# Patient Record
Sex: FEMALE | URBAN - METROPOLITAN AREA
[De-identification: names, ages, dates, MRNs, and addresses within clinical notes are randomized per-mention and may not be internally consistent; named-entity substitution may affect disease eponyms.]

---

## 2020-08-24 ENCOUNTER — APPOINTMENT (RX ONLY)
Dept: URBAN - METROPOLITAN AREA CLINIC 125 | Facility: CLINIC | Age: 45
Setting detail: DERMATOLOGY
End: 2020-08-24

## 2020-08-24 DIAGNOSIS — L30.0 NUMMULAR DERMATITIS: ICD-10-CM

## 2020-08-24 DIAGNOSIS — L84 CORNS AND CALLOSITIES: ICD-10-CM

## 2020-08-24 PROBLEM — L30.9 DERMATITIS, UNSPECIFIED: Status: ACTIVE | Noted: 2020-08-24

## 2020-08-24 PROCEDURE — ? TREATMENT REGIMEN

## 2020-08-24 PROCEDURE — ? COUNSELING

## 2020-08-24 PROCEDURE — 99202 OFFICE O/P NEW SF 15 MIN: CPT

## 2020-08-24 PROCEDURE — ? ADDITIONAL NOTES

## 2020-08-24 ASSESSMENT — LOCATION SIMPLE DESCRIPTION DERM
LOCATION SIMPLE: LEFT CHEEK
LOCATION SIMPLE: PLANTAR SURFACE OF LEFT 5TH TOE

## 2020-08-24 ASSESSMENT — LOCATION DETAILED DESCRIPTION DERM
LOCATION DETAILED: LEFT LATERAL PLANTAR 5TH TOE
LOCATION DETAILED: LEFT CENTRAL MALAR CHEEK

## 2020-08-24 ASSESSMENT — LOCATION ZONE DERM
LOCATION ZONE: TOE
LOCATION ZONE: FACE

## 2020-08-24 NOTE — PROCEDURE: TREATMENT REGIMEN
Plan: AAA of face bid x one week on, one week off PRN for flares
Detail Level: Zone
Initiate Treatment: Hydrocortisone 2.5% ointment (paper script faxed)

## 2020-08-24 NOTE — HPI: SKIN LESION
Is This A New Presentation, Or A Follow-Up?: Skin Lesion
How Severe Is Your Skin Lesion?: severe
Has Your Skin Lesion Been Treated?: been treated
Additional History: Patient states she had a wart treated at this site, would like it evaluated to ensure wart has completely resolved.

## 2020-08-24 NOTE — HPI: RASH
How Severe Is Your Rash?: severe
Is This A New Presentation, Or A Follow-Up?: Rash
Additional History: Patient states in the winter, she will develop a red, scaly, itchy patch on the left cheek that will improve with moisturizer and resolve by Spring. She states it flared at the beginning of Summer and is not fully resolving.